# Patient Record
Sex: MALE | Race: BLACK OR AFRICAN AMERICAN | NOT HISPANIC OR LATINO | Employment: FULL TIME | URBAN - METROPOLITAN AREA
[De-identification: names, ages, dates, MRNs, and addresses within clinical notes are randomized per-mention and may not be internally consistent; named-entity substitution may affect disease eponyms.]

---

## 2017-05-03 VITALS
HEART RATE: 105 BPM | BODY MASS INDEX: 31.67 KG/M2 | WEIGHT: 209 LBS | DIASTOLIC BLOOD PRESSURE: 70 MMHG | SYSTOLIC BLOOD PRESSURE: 120 MMHG | HEIGHT: 68 IN

## 2017-05-03 RX ORDER — METFORMIN HYDROCHLORIDE 500 MG/1
500 TABLET ORAL 2 TIMES DAILY WITH MEALS
COMMUNITY

## 2017-05-03 RX ORDER — POTASSIUM CHLORIDE 1.5 G/1.58G
20 POWDER, FOR SOLUTION ORAL DAILY
COMMUNITY

## 2017-05-03 RX ORDER — FUROSEMIDE 40 MG/1
40 TABLET ORAL DAILY
COMMUNITY

## 2017-05-03 RX ORDER — ATORVASTATIN CALCIUM 40 MG/1
40 TABLET, FILM COATED ORAL DAILY
COMMUNITY

## 2017-05-03 RX ORDER — BUSPIRONE HYDROCHLORIDE 15 MG/1
15 TABLET ORAL NIGHTLY
COMMUNITY

## 2017-05-03 RX ORDER — PANTOPRAZOLE SODIUM 40 MG/1
40 TABLET, DELAYED RELEASE ORAL DAILY
COMMUNITY

## 2017-05-03 RX ORDER — AMLODIPINE BESYLATE 10 MG/1
10 TABLET ORAL DAILY
COMMUNITY

## 2017-05-03 RX ORDER — PREGABALIN 75 MG/1
75 CAPSULE ORAL 2 TIMES DAILY
COMMUNITY

## 2017-05-03 RX ORDER — DILTIAZEM HYDROCHLORIDE 180 MG/1
180 CAPSULE, COATED, EXTENDED RELEASE ORAL DAILY
COMMUNITY

## 2017-05-19 PROBLEM — I10 ESSENTIAL (PRIMARY) HYPERTENSION: Status: ACTIVE | Noted: 2017-05-19

## 2017-05-19 PROBLEM — E66.9 ADIPOSITY: Status: ACTIVE | Noted: 2017-05-19

## 2017-05-19 PROBLEM — B96.89 ACUTE BACTERIAL BRONCHITIS: Status: ACTIVE | Noted: 2017-05-19

## 2017-05-19 PROBLEM — R09.02 HYPOXIA: Status: ACTIVE | Noted: 2017-05-19

## 2017-05-19 PROBLEM — J84.10 FIBROSIS OF LUNG: Status: ACTIVE | Noted: 2017-05-19

## 2017-05-19 PROBLEM — J30.2 SEASONAL ALLERGIC RHINITIS: Status: ACTIVE | Noted: 2017-05-19

## 2017-05-19 PROBLEM — E66.9 OBESITY WITH BODY MASS INDEX 30 OR GREATER: Status: ACTIVE | Noted: 2017-05-19

## 2017-05-19 PROBLEM — E11.9 TYPE 2 DIABETES MELLITUS WITHOUT COMPLICATION: Status: ACTIVE | Noted: 2017-05-19

## 2017-05-19 PROBLEM — G47.30 SLEEP APNEA: Status: ACTIVE | Noted: 2017-05-19

## 2017-05-19 PROBLEM — Z78.9 NON-SMOKER: Status: ACTIVE | Noted: 2017-05-19

## 2017-05-19 PROBLEM — J20.8 ACUTE BACTERIAL BRONCHITIS: Status: ACTIVE | Noted: 2017-05-19

## 2017-05-30 ENCOUNTER — OFFICE VISIT (OUTPATIENT)
Dept: PULMONOLOGY | Facility: CLINIC | Age: 62
End: 2017-05-30
Payer: COMMERCIAL

## 2017-05-30 VITALS
BODY MASS INDEX: 29.4 KG/M2 | DIASTOLIC BLOOD PRESSURE: 70 MMHG | HEART RATE: 74 BPM | OXYGEN SATURATION: 94 % | SYSTOLIC BLOOD PRESSURE: 114 MMHG | HEIGHT: 68 IN | WEIGHT: 194 LBS

## 2017-05-30 DIAGNOSIS — R09.02 HYPOXIA: ICD-10-CM

## 2017-05-30 DIAGNOSIS — Z78.9 NON-SMOKER: ICD-10-CM

## 2017-05-30 DIAGNOSIS — G47.30 SLEEP APNEA, UNSPECIFIED TYPE: ICD-10-CM

## 2017-05-30 DIAGNOSIS — J84.10 FIBROSIS OF LUNG: Primary | ICD-10-CM

## 2017-05-30 DIAGNOSIS — I10 ESSENTIAL (PRIMARY) HYPERTENSION: ICD-10-CM

## 2017-05-30 DIAGNOSIS — E66.9 ADIPOSITY: ICD-10-CM

## 2017-05-30 DIAGNOSIS — E11.9 TYPE 2 DIABETES MELLITUS WITHOUT COMPLICATION, WITHOUT LONG-TERM CURRENT USE OF INSULIN: ICD-10-CM

## 2017-05-30 PROCEDURE — 99203 OFFICE O/P NEW LOW 30 MIN: CPT | Mod: ,,, | Performed by: INTERNAL MEDICINE

## 2017-05-30 NOTE — PROGRESS NOTES
Subjective:       Patient ID: Tino Zamorano is a 61 y.o. male.    Chief Complaint: Pulmonary Fibrosis (pulmonary fibrosis follow up, 3 month)    Here for follow up.  Doing well with OFEV, actually feels better - less SOB, SEE, less need for O2, less cough.  Has minimal diarrhea.  No new problems.  Reviewed [facility] pt about the medications and goals of therapy.  In March he was in hospital with atrial fibrillation (he is paroxysmal) - saw Dr Jacques at [facility].  No other new issues reported.    Home Oxygen    Face to face visit with pt regarding their home oxygen.  We have discussed the need for oxygen including continuous use, prn use or night time use (as appropriate for the pt).  We discussed the need for compliance with the therapy. We will do recertifications as necessary.     SLEEP STUDY ORDER    Pt has evidence for sleep apnea including snoring, observed apneas, poor quality sleep, excessive somnolence ([place] Sleepiness Score - 13).  Medically she needs a sleep study for evaluation and diagnosis.  This is a face to face visit regarding the need for a sleep study and possible further evaluation and/or treatment.  Pt will consider doing the test and let me know.    Review of Systems   Constitutional: Negative for activity change, chills, diaphoresis, fatigue and fever.   HENT: Negative for congestion, postnasal drip, rhinorrhea, sinus pressure, sneezing and sore throat.    Eyes: Negative for visual disturbance.   Respiratory: Negative for apnea, cough, choking, chest tightness, shortness of breath, wheezing and stridor.    Cardiovascular: Negative for chest pain, palpitations and leg swelling.   Gastrointestinal: Negative for abdominal pain, constipation, diarrhea and nausea.   Genitourinary: Negative for dysuria, frequency and urgency.   Musculoskeletal: Negative for arthralgias.   Skin: Negative for rash.   Neurological: Negative for dizziness, syncope and weakness.   Psychiatric/Behavioral: Negative for behavioral  problems and sleep disturbance. The patient is not nervous/anxious.        Objective:      Physical Exam   Constitutional: He is oriented to person, place, and time. He appears well-developed and well-nourished. No distress.   HENT:   Head: Normocephalic and atraumatic.   Right Ear: External ear normal.   Left Ear: External ear normal.   Nose: Nose normal.   Mouth/Throat: Oropharynx is clear and moist.   Eyes: EOM are normal. Pupils are equal, round, and reactive to light.   Neck: Normal range of motion. Neck supple. No JVD present. No tracheal deviation present. No thyromegaly present.   Cardiovascular: Normal rate, regular rhythm, normal heart sounds and intact distal pulses.  Exam reveals no gallop and no friction rub.    No murmur heard.  Pulmonary/Chest: Effort normal. No stridor. No respiratory distress. He has no wheezes. He has rales. He exhibits no tenderness.   Abdominal: Soft. Bowel sounds are normal. He exhibits no distension.   Musculoskeletal: Normal range of motion. He exhibits no edema or tenderness.   Missing right arm   Lymphadenopathy:     He has no cervical adenopathy.   Neurological: He is alert and oriented to person, place, and time. He has normal reflexes. No cranial nerve deficit.   Skin: He is not diaphoretic.   Psychiatric: He has a normal mood and affect. His behavior is normal.   Nursing note and vitals reviewed.      Assessment:       1. Fibrosis of lung    2. Hypoxia    3. Sleep apnea, unspecified type    4. Essential (primary) hypertension    5. Type 2 diabetes mellitus without complication, without long-term current use of insulin    6. Adiposity    7. Non-smoker        Plan:       Problem List Items Addressed This Visit        Neuro    Sleep apnea  - aware, has still not decided if he wishes further workup  - told to call if he wishes to pursue this.       Pulmonary    Fibrosis of lung - Primary  - consistent with UIP  - on OFEV  - recheck labs  - RTC 3 months    Hypoxia  -  continues with O2 as needed during the day and qhs       Cardiac    Essential (primary) hypertension  - adequate control       Endocrine    Type 2 diabetes mellitus without complication  - aware  - reports good control on metformin       Fluids/Electrolytes/Nutrition/GI    Adiposity  - d/w pt on need to exercise and work on weight loss       Other    Non-smoker      Other Visit Diagnoses    None.

## 2017-06-14 ENCOUNTER — TELEPHONE (OUTPATIENT)
Dept: PULMONOLOGY | Facility: CLINIC | Age: 62
End: 2017-06-14

## 2017-08-28 ENCOUNTER — TELEPHONE (OUTPATIENT)
Dept: PULMONOLOGY | Facility: CLINIC | Age: 62
End: 2017-08-28

## 2017-08-30 ENCOUNTER — OFFICE VISIT (OUTPATIENT)
Dept: PULMONOLOGY | Facility: CLINIC | Age: 62
End: 2017-08-30
Payer: COMMERCIAL

## 2017-08-30 VITALS
OXYGEN SATURATION: 97 % | HEART RATE: 115 BPM | WEIGHT: 190 LBS | BODY MASS INDEX: 28.79 KG/M2 | DIASTOLIC BLOOD PRESSURE: 78 MMHG | HEIGHT: 68 IN | SYSTOLIC BLOOD PRESSURE: 120 MMHG

## 2017-08-30 DIAGNOSIS — R09.02 HYPOXIA: ICD-10-CM

## 2017-08-30 DIAGNOSIS — Z78.9 NON-SMOKER: ICD-10-CM

## 2017-08-30 DIAGNOSIS — J32.9 SINUSITIS, UNSPECIFIED CHRONICITY, UNSPECIFIED LOCATION: ICD-10-CM

## 2017-08-30 DIAGNOSIS — G47.30 SLEEP APNEA, UNSPECIFIED TYPE: ICD-10-CM

## 2017-08-30 DIAGNOSIS — J84.10 FIBROSIS OF LUNG: Primary | ICD-10-CM

## 2017-08-30 PROCEDURE — 3078F DIAST BP <80 MM HG: CPT | Mod: ,,, | Performed by: INTERNAL MEDICINE

## 2017-08-30 PROCEDURE — 3008F BODY MASS INDEX DOCD: CPT | Mod: ,,, | Performed by: INTERNAL MEDICINE

## 2017-08-30 PROCEDURE — 99214 OFFICE O/P EST MOD 30 MIN: CPT | Mod: ,,, | Performed by: INTERNAL MEDICINE

## 2017-08-30 PROCEDURE — 3074F SYST BP LT 130 MM HG: CPT | Mod: ,,, | Performed by: INTERNAL MEDICINE

## 2017-08-30 RX ORDER — AZITHROMYCIN 250 MG/1
TABLET, FILM COATED ORAL
Qty: 6 TABLET | Refills: 0 | Status: SHIPPED | OUTPATIENT
Start: 2017-08-30 | End: 2017-10-25 | Stop reason: SDUPTHER

## 2017-08-30 NOTE — PROGRESS NOTES
Subjective:       Patient ID: Tino Zamorano is a 61 y.o. male.    Chief Complaint: Pulmonary Fibrosis (3 month follow up) and Sleep Apnea    Here for follow up.  Doing well with OFEV, actually feels better - less SOB, SEE, less need for O2, less cough.  Has minimal diarrhea.  Some loss of appetite.  Has developed a sinusitis (hios wife is sick also).  No other new issues.  Had CMP at Dr Javed's office which was ok.   Has flipped into atrial fibrillation and Dr Javed is addressing and may do cardioversion.      Pulmonary Fibrosis   Pertinent negatives include no abdominal pain, arthralgias, chest pain, chills, congestion, coughing, diaphoresis, fatigue, fever, nausea, rash, sore throat or weakness.     Home Oxygen    Face to face visit with pt regarding their home oxygen.  We have discussed the need for oxygen including continuous use, prn use or night time use (as appropriate for the pt).  We discussed the need for compliance with the therapy. We will do recertifications as necessary.     SLEEP STUDY ORDER    Pt has evidence for sleep apnea including snoring, observed apneas, poor quality sleep, excessive somnolence ([place] Sleepiness Score - 13).  Medically she needs a sleep study for evaluation and diagnosis.  This is a face to face visit regarding the need for a sleep study and possible further evaluation and/or treatment.  Pt will consider doing the test and let me know.    Review of Systems   Constitutional: Negative for activity change, chills, diaphoresis, fatigue and fever.   HENT: Negative for congestion, postnasal drip, rhinorrhea, sinus pressure, sneezing and sore throat.    Eyes: Negative for visual disturbance.   Respiratory: Negative for apnea, cough, choking, chest tightness, shortness of breath, wheezing and stridor.    Cardiovascular: Negative for chest pain, palpitations and leg swelling.   Gastrointestinal: Negative for abdominal pain, constipation, diarrhea and nausea.   Genitourinary: Negative  for dysuria, frequency and urgency.   Musculoskeletal: Negative for arthralgias.   Skin: Negative for rash.   Neurological: Negative for dizziness, syncope and weakness.   Psychiatric/Behavioral: Negative for behavioral problems and sleep disturbance. The patient is not nervous/anxious.        Objective:      Physical Exam   Constitutional: He is oriented to person, place, and time. He appears well-developed and well-nourished. No distress.   HENT:   Head: Normocephalic and atraumatic.   Right Ear: External ear normal.   Left Ear: External ear normal.   Nose: Nose normal.   Mouth/Throat: Oropharynx is clear and moist.   Eyes: EOM are normal. Pupils are equal, round, and reactive to light.   Neck: Normal range of motion. Neck supple. No JVD present. No tracheal deviation present. No thyromegaly present.   Cardiovascular: Normal rate, regular rhythm, normal heart sounds and intact distal pulses.  Exam reveals no gallop and no friction rub.    No murmur heard.  Pulmonary/Chest: Effort normal. No stridor. No respiratory distress. He has no wheezes. He has rales. He exhibits no tenderness.   Abdominal: Soft. Bowel sounds are normal. He exhibits no distension.   Musculoskeletal: Normal range of motion. He exhibits no edema or tenderness.   Missing right arm   Lymphadenopathy:     He has no cervical adenopathy.   Neurological: He is alert and oriented to person, place, and time. He has normal reflexes. No cranial nerve deficit.   Skin: He is not diaphoretic.   Psychiatric: He has a normal mood and affect. His behavior is normal.   Nursing note and vitals reviewed.      Assessment:       No diagnosis found.    Plan:       Problem List Items Addressed This Visit        Neuro    Sleep apnea  - aware, has still not decided if he wishes further workup  - told to call if he wishes to pursue this.       Pulmonary    Fibrosis of lung - Primary  - consistent with UIP  - on OFEV  - labs ok  - RTC 3 months    Hypoxia  - continues  with O2 as needed during the day and qhs       Cardiac    Essential (primary) hypertension  - adequate control       Endocrine    Type 2 diabetes mellitus without complication  - aware  - reports good control on metformin       Fluids/Electrolytes/Nutrition/GI    Adiposity  - d/w pt on need to exercise and work on weight loss       Other    Non-smoker      Other Visit Diagnoses    None.

## 2017-10-24 ENCOUNTER — TELEPHONE (OUTPATIENT)
Dept: PULMONOLOGY | Facility: CLINIC | Age: 62
End: 2017-10-24

## 2017-10-24 DIAGNOSIS — J32.9 SINUSITIS, UNSPECIFIED CHRONICITY, UNSPECIFIED LOCATION: ICD-10-CM

## 2017-10-25 RX ORDER — AZITHROMYCIN 250 MG/1
TABLET, FILM COATED ORAL
Qty: 6 TABLET | Refills: 0 | Status: SHIPPED | OUTPATIENT
Start: 2017-10-25 | End: 2018-01-10 | Stop reason: SDUPTHER

## 2017-10-25 RX ORDER — PREDNISONE 10 MG/1
TABLET ORAL
Qty: 18 TABLET | Refills: 0 | Status: SHIPPED | OUTPATIENT
Start: 2017-10-25 | End: 2018-01-10 | Stop reason: SDUPTHER

## 2017-11-29 ENCOUNTER — OFFICE VISIT (OUTPATIENT)
Dept: PULMONOLOGY | Facility: CLINIC | Age: 62
End: 2017-11-29
Payer: COMMERCIAL

## 2017-11-29 VITALS
BODY MASS INDEX: 29.7 KG/M2 | SYSTOLIC BLOOD PRESSURE: 120 MMHG | DIASTOLIC BLOOD PRESSURE: 84 MMHG | HEIGHT: 68 IN | HEART RATE: 91 BPM | WEIGHT: 196 LBS | OXYGEN SATURATION: 95 %

## 2017-11-29 DIAGNOSIS — R09.02 HYPOXIA: ICD-10-CM

## 2017-11-29 DIAGNOSIS — J84.10 FIBROSIS OF LUNG: Primary | ICD-10-CM

## 2017-11-29 PROCEDURE — 99214 OFFICE O/P EST MOD 30 MIN: CPT | Mod: ,,, | Performed by: INTERNAL MEDICINE

## 2017-11-29 NOTE — PROGRESS NOTES
Subjective:       Patient ID: Tino Zamorano is a 62 y.o. male.    Chief Complaint: Pulmonary Fibrosis (follow up 3 month)    8/30/2017 - Here for follow up.  Doing well with OFEV, actually feels better - less SOB, SEE, less need for O2, less cough.  Has minimal diarrhea.  Some loss of appetite.  Has developed a sinusitis (hios wife is sick also).  No other new issues.  Had CMP at Dr Liu's office which was ok.   Has flipped into atrial fibrillation and Dr Liu is addressing and may do cardioversion.    11/29/2017 - Here for follow.  Still taking OFEV.  Has good days and bad days.  Appetite is better.    No trouble with diarrhea at this time.  Did not get atrial fibrillation converted but seems to be in NSR by exam today.  No new issues reported.      Pulmonary Fibrosis   Pertinent negatives include no abdominal pain, arthralgias, chest pain, chills, congestion, coughing, diaphoresis, fatigue, fever, nausea, rash, sore throat or weakness.     Home Oxygen    Face to face visit with pt regarding their home oxygen.  We have discussed the need for oxygen including continuous use, prn use or night time use (as appropriate for the pt).  We discussed the need for compliance with the therapy. We will do recertifications as necessary.     SLEEP STUDY ORDER    Pt has evidence for sleep apnea including snoring, observed apneas, poor quality sleep, excessive somnolence ([place] Sleepiness Score - 13).  Medically he needs a sleep study for evaluation and diagnosis.  This is a face to face visit regarding the need for a sleep study and possible further evaluation and/or treatment.  Pt will consider doing the test and let me know.    Review of Systems   Constitutional: Negative for activity change, chills, diaphoresis, fatigue and fever.   HENT: Negative for congestion, postnasal drip, rhinorrhea, sinus pressure, sneezing and sore throat.    Eyes: Negative for visual disturbance.   Respiratory: Negative for apnea, cough, choking,  chest tightness, shortness of breath, wheezing and stridor.    Cardiovascular: Negative for chest pain, palpitations and leg swelling.   Gastrointestinal: Negative for abdominal pain, constipation, diarrhea and nausea.   Genitourinary: Negative for dysuria, frequency and urgency.   Musculoskeletal: Negative for arthralgias.   Skin: Negative for rash.   Neurological: Negative for dizziness, syncope and weakness.   Psychiatric/Behavioral: Negative for behavioral problems and sleep disturbance. The patient is not nervous/anxious.        Objective:      Physical Exam   Constitutional: He is oriented to person, place, and time. He appears well-developed and well-nourished. No distress.   HENT:   Head: Normocephalic and atraumatic.   Right Ear: External ear normal.   Left Ear: External ear normal.   Nose: Nose normal.   Mouth/Throat: Oropharynx is clear and moist.   Eyes: EOM are normal. Pupils are equal, round, and reactive to light.   Neck: Normal range of motion. Neck supple. No JVD present. No tracheal deviation present. No thyromegaly present.   Cardiovascular: Normal rate, regular rhythm, normal heart sounds and intact distal pulses.  Exam reveals no gallop and no friction rub.    No murmur heard.  Pulmonary/Chest: Effort normal. No stridor. No respiratory distress. He has no wheezes. He has rales. He exhibits no tenderness.   Abdominal: Soft. Bowel sounds are normal. He exhibits no distension.   Musculoskeletal: Normal range of motion. He exhibits no edema or tenderness.   Missing right arm   Lymphadenopathy:     He has no cervical adenopathy.   Neurological: He is alert and oriented to person, place, and time. He has normal reflexes. No cranial nerve deficit.   Skin: He is not diaphoretic.   Psychiatric: He has a normal mood and affect. His behavior is normal.   Nursing note and vitals reviewed.      Assessment:       No diagnosis found.    Plan:       Problem List Items Addressed This Visit        Neuro    Sleep  apnea  - aware, has still not decided if he wishes further workup  - told to call if he wishes to pursue this.       Pulmonary    Fibrosis of lung - Primary  - consistent with UIP  - on OFEV  - labs ok  - RTC 3 months will repeat labs after that visit    Hypoxia  - continues with O2 as needed during the day and qhs       Cardiac    Essential (primary) hypertension  - adequate control       Endocrine    Type 2 diabetes mellitus without complication  - aware  - reports good control on metformin       Fluids/Electrolytes/Nutrition/GI    Adiposity  - d/w pt on need to exercise and work on weight loss       Other    Non-smoker      Other Visit Diagnoses    None.

## 2018-01-10 ENCOUNTER — TELEPHONE (OUTPATIENT)
Dept: PULMONOLOGY | Facility: CLINIC | Age: 63
End: 2018-01-10

## 2018-01-10 DIAGNOSIS — J32.9 SINUSITIS, UNSPECIFIED CHRONICITY, UNSPECIFIED LOCATION: ICD-10-CM

## 2018-01-10 RX ORDER — AZITHROMYCIN 250 MG/1
TABLET, FILM COATED ORAL
Qty: 6 TABLET | Refills: 0 | Status: SHIPPED | OUTPATIENT
Start: 2018-01-10 | End: 2018-02-28 | Stop reason: SDUPTHER

## 2018-01-10 RX ORDER — PREDNISONE 10 MG/1
TABLET ORAL
Qty: 18 TABLET | Refills: 0 | Status: SHIPPED | OUTPATIENT
Start: 2018-01-10 | End: 2018-02-28 | Stop reason: SDUPTHER

## 2018-01-10 NOTE — TELEPHONE ENCOUNTER
Antibiotic and steroid needed, starting to have chest congestion and productive cough. Wont be able to come in, having to take care of , she fell and has fractured her leg x3, in wheelchair x 6 months

## 2018-02-28 ENCOUNTER — OFFICE VISIT (OUTPATIENT)
Dept: PULMONOLOGY | Facility: CLINIC | Age: 63
End: 2018-02-28
Payer: COMMERCIAL

## 2018-02-28 VITALS
HEART RATE: 84 BPM | OXYGEN SATURATION: 97 % | BODY MASS INDEX: 29.7 KG/M2 | SYSTOLIC BLOOD PRESSURE: 120 MMHG | DIASTOLIC BLOOD PRESSURE: 74 MMHG | WEIGHT: 196 LBS | HEIGHT: 68 IN

## 2018-02-28 DIAGNOSIS — J32.9 SINUSITIS, UNSPECIFIED CHRONICITY, UNSPECIFIED LOCATION: ICD-10-CM

## 2018-02-28 DIAGNOSIS — R09.02 HYPOXIA: ICD-10-CM

## 2018-02-28 DIAGNOSIS — G47.33 OBSTRUCTIVE SLEEP APNEA SYNDROME: ICD-10-CM

## 2018-02-28 DIAGNOSIS — J84.10 FIBROSIS OF LUNG: Primary | ICD-10-CM

## 2018-02-28 PROCEDURE — 3008F BODY MASS INDEX DOCD: CPT | Mod: ,,, | Performed by: INTERNAL MEDICINE

## 2018-02-28 PROCEDURE — 99214 OFFICE O/P EST MOD 30 MIN: CPT | Mod: ,,, | Performed by: INTERNAL MEDICINE

## 2018-02-28 RX ORDER — AZITHROMYCIN 250 MG/1
TABLET, FILM COATED ORAL
Qty: 6 TABLET | Refills: 0 | Status: SHIPPED | OUTPATIENT
Start: 2018-02-28 | End: 2018-04-10 | Stop reason: SDUPTHER

## 2018-02-28 RX ORDER — PREDNISONE 10 MG/1
TABLET ORAL
Qty: 18 TABLET | Refills: 0 | Status: SHIPPED | OUTPATIENT
Start: 2018-02-28 | End: 2018-06-01 | Stop reason: SDUPTHER

## 2018-02-28 NOTE — PROGRESS NOTES
Subjective:       Patient ID: Tino Zamorano is a 62 y.o. male.    Chief Complaint: Pulmonary Fibrosis (3 month fibrosis follow up)    8/30/2017 - Here for follow up.  Doing well with OFEV, actually feels better - less SOB, SEE, less need for O2, less cough.  Has minimal diarrhea.  Some loss of appetite.  Has developed a sinusitis (hios wife is sick also).  No other new issues.  Had CMP at Dr Liu's office which was ok.   Has flipped into atrial fibrillation and Dr Liu is addressing and may do cardioversion.    11/29/2017 - Here for follow.  Still taking OFEV.  Has good days and bad days.  Appetite is better.    No trouble with diarrhea at this time.  Did not get atrial fibrillation converted but seems to be in NSR by exam today.  No new issues reported.    2/28/2018 - Here for follow up reports that about 3 weeks ago had some dark stools.  He held his Eliquis and OFEV for a few days and the problem resolved.  Stools have normalized.  Otherwise doing OK.  I have give him the names of several GI MD in the [place] area to contact about a appointment.  He does have a little cough and congestion.      Pulmonary Fibrosis   Pertinent negatives include no abdominal pain, arthralgias, chest pain, chills, congestion, coughing, diaphoresis, fatigue, fever, nausea, rash, sore throat or weakness.     Home Oxygen    Face to face visit with pt regarding their home oxygen.  We have discussed the need for oxygen including continuous use, prn use or night time use (as appropriate for the pt).  We discussed the need for compliance with the therapy. We will do recertifications as necessary.     SLEEP STUDY ORDER    Pt has evidence for sleep apnea including snoring, observed apneas, poor quality sleep, excessive somnolence ([place] Sleepiness Score - 13).  Medically he needs a sleep study for evaluation and diagnosis.  This is a face to face visit regarding the need for a sleep study and possible further evaluation and/or treatment.   Pt will consider doing the test and let me know.    Review of Systems   Constitutional: Negative for activity change, chills, diaphoresis, fatigue and fever.   HENT: Negative for congestion, postnasal drip, rhinorrhea, sinus pressure, sneezing and sore throat.    Eyes: Negative for visual disturbance.   Respiratory: Negative for apnea, cough, choking, chest tightness, shortness of breath, wheezing and stridor.    Cardiovascular: Negative for chest pain, palpitations and leg swelling.   Gastrointestinal: Negative for abdominal pain, constipation, diarrhea and nausea.   Genitourinary: Negative for dysuria, frequency and urgency.   Musculoskeletal: Negative for arthralgias.   Skin: Negative for rash.   Neurological: Negative for dizziness, syncope and weakness.   Psychiatric/Behavioral: Negative for behavioral problems and sleep disturbance. The patient is not nervous/anxious.        Objective:      Physical Exam   Constitutional: He is oriented to person, place, and time. He appears well-developed and well-nourished. No distress.   HENT:   Head: Normocephalic and atraumatic.   Right Ear: External ear normal.   Left Ear: External ear normal.   Nose: Nose normal.   Mouth/Throat: Oropharynx is clear and moist.   Eyes: EOM are normal. Pupils are equal, round, and reactive to light.   Neck: Normal range of motion. Neck supple. No JVD present. No tracheal deviation present. No thyromegaly present.   Cardiovascular: Normal rate, regular rhythm, normal heart sounds and intact distal pulses.  Exam reveals no gallop and no friction rub.    No murmur heard.  Pulmonary/Chest: Effort normal. No stridor. No respiratory distress. He has no wheezes. He has rales. He exhibits no tenderness.   Rales about 1/4 way up posterior   Abdominal: Soft. Bowel sounds are normal. He exhibits no distension.   Musculoskeletal: Normal range of motion. He exhibits no edema or tenderness.   Missing right arm   Lymphadenopathy:     He has no cervical  adenopathy.   Neurological: He is alert and oriented to person, place, and time. He has normal reflexes. No cranial nerve deficit.   Skin: He is not diaphoretic.   Psychiatric: He has a normal mood and affect. His behavior is normal.   Nursing note and vitals reviewed.      Assessment:       No diagnosis found.    Plan:       Problem List Items Addressed This Visit        Neuro    Sleep apnea  - aware, has still not decided if he wishes further workup  - told to call if he wishes to pursue this.       Pulmonary    Fibrosis of lung - Primary  - consistent with UIP  - on OFEV  - labs o- to be rechecked through Dr Liu's office  - RTC 3 months    Hypoxia  - continues with O2 as needed during the day and qhs       Cardiac    Essential (primary) hypertension  - adequate control       Endocrine    Type 2 diabetes mellitus without complication  - aware  - reports good control on metformin       Fluids/Electrolytes/Nutrition/GI    Adiposity  - d/w pt on need to exercise and work on weight loss       Other    Non-smoker      Other Visit Diagnoses    None.

## 2018-04-09 ENCOUNTER — TELEPHONE (OUTPATIENT)
Dept: PULMONOLOGY | Facility: CLINIC | Age: 63
End: 2018-04-09

## 2018-04-09 DIAGNOSIS — J32.9 SINUSITIS, UNSPECIFIED CHRONICITY, UNSPECIFIED LOCATION: ICD-10-CM

## 2018-04-09 NOTE — TELEPHONE ENCOUNTER
Had bloodwork recently that hes faxing over bc LFT's elevated. I will forward when they come, told patient depending on how high we may have him just repeat in a week. Asked if any recent illnesses, he did report having minor cold symptoms recently.    Called back Tuesday asking if we can go ahead and send a rx for all the chest congestion he is having

## 2018-04-10 ENCOUNTER — TELEPHONE (OUTPATIENT)
Dept: PULMONOLOGY | Facility: CLINIC | Age: 63
End: 2018-04-10

## 2018-04-10 RX ORDER — AZITHROMYCIN 250 MG/1
TABLET, FILM COATED ORAL
Qty: 6 TABLET | Refills: 0 | Status: SHIPPED | OUTPATIENT
Start: 2018-04-10 | End: 2018-06-01 | Stop reason: SDUPTHER

## 2018-06-01 ENCOUNTER — TELEPHONE (OUTPATIENT)
Dept: PULMONOLOGY | Facility: CLINIC | Age: 63
End: 2018-06-01

## 2018-06-01 DIAGNOSIS — J32.9 SINUSITIS, UNSPECIFIED CHRONICITY, UNSPECIFIED LOCATION: ICD-10-CM

## 2018-06-01 RX ORDER — AZITHROMYCIN 250 MG/1
TABLET, FILM COATED ORAL
Qty: 6 TABLET | Refills: 0 | Status: SHIPPED | OUTPATIENT
Start: 2018-06-01 | End: 2018-08-21 | Stop reason: SDUPTHER

## 2018-06-01 RX ORDER — PREDNISONE 10 MG/1
TABLET ORAL
Qty: 18 TABLET | Refills: 0 | Status: SHIPPED | OUTPATIENT
Start: 2018-06-01 | End: 2018-08-21 | Stop reason: SDUPTHER

## 2018-06-01 NOTE — TELEPHONE ENCOUNTER
Ms. Ventura got admitted to [facility] for pneumonia following her recent fall and anle fx., now  c/o chest congestion and productive cough, asking for abx & steroid so he doesn't end up as bad as her.

## 2018-07-23 ENCOUNTER — OFFICE VISIT (OUTPATIENT)
Dept: PULMONOLOGY | Facility: CLINIC | Age: 63
End: 2018-07-23
Payer: COMMERCIAL

## 2018-07-23 VITALS
OXYGEN SATURATION: 95 % | SYSTOLIC BLOOD PRESSURE: 110 MMHG | BODY MASS INDEX: 30.31 KG/M2 | HEIGHT: 68 IN | DIASTOLIC BLOOD PRESSURE: 78 MMHG | WEIGHT: 200 LBS | HEART RATE: 94 BPM

## 2018-07-23 DIAGNOSIS — J84.10 FIBROSIS OF LUNG: Primary | ICD-10-CM

## 2018-07-23 DIAGNOSIS — R09.02 HYPOXIA: ICD-10-CM

## 2018-07-23 DIAGNOSIS — G47.33 OBSTRUCTIVE SLEEP APNEA SYNDROME: ICD-10-CM

## 2018-07-23 PROCEDURE — 99214 OFFICE O/P EST MOD 30 MIN: CPT | Mod: ,,, | Performed by: INTERNAL MEDICINE

## 2018-07-23 NOTE — PROGRESS NOTES
Subjective:       Patient ID: Tino Zamorano is a 62 y.o. male.    Chief Complaint: Pulmonary Fibrosis (Ofev side effects, nausea, vomiting, diarrhea)    8/30/2017 - Here for follow up.  Doing well with OFEV, actually feels better - less SOB, SEE, less need for O2, less cough.  Has minimal diarrhea.  Some loss of appetite.  Has developed a sinusitis (hios wife is sick also).  No other new issues.  Had CMP at Dr Liu's office which was ok.   Has flipped into atrial fibrillation and Dr Liu is addressing and may do cardioversion.    11/29/2017 - Here for follow.  Still taking OFEV.  Has good days and bad days.  Appetite is better.    No trouble with diarrhea at this time.  Did not get atrial fibrillation converted but seems to be in NSR by exam today.  No new issues reported.    2/28/2018 - Here for follow up reports that about 3 weeks ago had some dark stools.  He held his Eliquis and OFEV for a few days and the problem resolved.  Stools have normalized.  Otherwise doing OK.  I have give him the names of several GI MD in the [place] area to contact about a appointment.  He does have a little cough and congestion.    7/23/2018 - Here for follow up, has noted some trouble with nausea, vomiting and diarrhea over the last week.. Has started to hold his OFEV.  NO fever, chills, sweats, only crampy abdominal pain.  Breathing has been ok.  Labs in 3/2018 were ok except for elevated T Bili and alkaline phosphatase.  No other new symptoms.       Pulmonary Fibrosis   Pertinent negatives include no abdominal pain, arthralgias, chest pain, chills, congestion, coughing, diaphoresis, fatigue, fever, nausea, rash, sore throat or weakness.     Home Oxygen    Face to face visit with pt regarding their home oxygen.  We have discussed the need for oxygen including continuous use, prn use or night time use (as appropriate for the pt).  We discussed the need for compliance with the therapy. We will do recertifications as necessary.  "    SLEEP STUDY ORDER    Pt has evidence for sleep apnea including snoring, observed apneas, poor quality sleep, excessive somnolence ([place] Sleepiness Score - 13).  Medically he needs a sleep study for evaluation and diagnosis.  This is a face to face visit regarding the need for a sleep study and possible further evaluation and/or treatment.  Pt will consider doing the test and let me know.    Review of Systems   Constitutional: Negative for activity change, chills, diaphoresis, fatigue and fever.   HENT: Negative for congestion, postnasal drip, rhinorrhea, sinus pressure, sneezing and sore throat.    Eyes: Negative for visual disturbance.   Respiratory: Negative for apnea, cough, choking, chest tightness, shortness of breath, wheezing and stridor.    Cardiovascular: Negative for chest pain, palpitations and leg swelling.   Gastrointestinal: Negative for abdominal pain, constipation, diarrhea and nausea.   Genitourinary: Negative for dysuria, frequency and urgency.   Musculoskeletal: Negative for arthralgias.   Skin: Negative for rash.   Neurological: Negative for dizziness, syncope and weakness.   Psychiatric/Behavioral: Negative for behavioral problems and sleep disturbance. The patient is not nervous/anxious.        Objective:       Vitals:    07/23/18 0935   BP: 110/78   Pulse: 94   SpO2: 95%   Weight: 90.7 kg (200 lb)   Height: 5' 8" (1.727 m)       Physical Exam   Constitutional: He is oriented to person, place, and time. He appears well-developed and well-nourished. No distress.   HENT:   Head: Normocephalic and atraumatic.   Right Ear: External ear normal.   Left Ear: External ear normal.   Nose: Nose normal.   Mouth/Throat: Oropharynx is clear and moist.   Eyes: EOM are normal. Pupils are equal, round, and reactive to light.   Neck: Normal range of motion. Neck supple. No JVD present. No tracheal deviation present. No thyromegaly present.   Cardiovascular: Normal rate, regular rhythm and intact distal " pulses.  Exam reveals no gallop and no friction rub.    Murmur heard.  Pulmonary/Chest: Effort normal. No stridor. No respiratory distress. He has no wheezes. He has rales. He exhibits no tenderness.   Rales about 1/4 way up posterior   Abdominal: Soft. Bowel sounds are normal. He exhibits no distension.   Musculoskeletal: Normal range of motion. He exhibits no edema or tenderness.   Missing right arm   Lymphadenopathy:     He has no cervical adenopathy.   Neurological: He is alert and oriented to person, place, and time. He has normal reflexes. No cranial nerve deficit.   Skin: He is not diaphoretic.   Psychiatric: He has a normal mood and affect. His behavior is normal.   Nursing note and vitals reviewed.      Assessment:       No diagnosis found.    Plan:       Problem List Items Addressed This Visit        Neuro    Sleep apnea  - aware, has still not decided if he wishes further workup  - told to call if he wishes to pursue this.       Pulmonary    Fibrosis of lung - Primary  - consistent with UIP  - on OFEV - currently being held  - labs - CMP, CBC  - RTC 3 months      Hypoxia  - continues with O2 as needed during the day and qhs       Cardiac    Essential (primary) hypertension  - adequate control       Endocrine    Type 2 diabetes mellitus without complication  - aware  - reports good control on metformin       Fluids/Electrolytes/Nutrition/GI    Adiposity  - d/w pt on need to exercise and work on weight loss       Other    Non-smoker      Other Visit Diagnoses    None.

## 2018-07-25 ENCOUNTER — TELEPHONE (OUTPATIENT)
Dept: PULMONOLOGY | Facility: CLINIC | Age: 63
End: 2018-07-25

## 2018-08-21 ENCOUNTER — TELEPHONE (OUTPATIENT)
Dept: PULMONOLOGY | Facility: CLINIC | Age: 63
End: 2018-08-21

## 2018-08-21 DIAGNOSIS — J32.9 SINUSITIS, UNSPECIFIED CHRONICITY, UNSPECIFIED LOCATION: ICD-10-CM

## 2018-08-21 RX ORDER — AZITHROMYCIN 250 MG/1
TABLET, FILM COATED ORAL
Qty: 6 TABLET | Refills: 0 | Status: SHIPPED | OUTPATIENT
Start: 2018-08-21 | End: 2019-08-29 | Stop reason: SDUPTHER

## 2018-08-21 RX ORDER — PREDNISONE 10 MG/1
TABLET ORAL
Qty: 18 TABLET | Refills: 0 | Status: SHIPPED | OUTPATIENT
Start: 2018-08-21 | End: 2019-05-21 | Stop reason: SDUPTHER

## 2018-09-26 ENCOUNTER — TELEPHONE (OUTPATIENT)
Dept: PULMONOLOGY | Facility: CLINIC | Age: 63
End: 2018-09-26

## 2018-09-26 DIAGNOSIS — K92.1 BLOOD IN STOOL: Primary | ICD-10-CM

## 2018-09-26 DIAGNOSIS — J84.10 PULMONARY FIBROSIS: ICD-10-CM

## 2018-09-26 DIAGNOSIS — J84.10 PULMONARY FIBROSIS: Primary | ICD-10-CM

## 2018-09-26 DIAGNOSIS — K92.1 MELENA: ICD-10-CM

## 2018-09-26 NOTE — TELEPHONE ENCOUNTER
Decreased Ofev to just 1/day, been off Eliquis but having nose bleeds, dark stools and bloating. I asked about when he can see his PCP, states has appt Tuesday next week

## 2018-10-31 ENCOUNTER — OFFICE VISIT (OUTPATIENT)
Dept: PULMONOLOGY | Facility: CLINIC | Age: 63
End: 2018-10-31
Payer: COMMERCIAL

## 2018-10-31 VITALS
HEIGHT: 68 IN | WEIGHT: 200 LBS | OXYGEN SATURATION: 88 % | BODY MASS INDEX: 30.31 KG/M2 | DIASTOLIC BLOOD PRESSURE: 70 MMHG | HEART RATE: 84 BPM | SYSTOLIC BLOOD PRESSURE: 120 MMHG

## 2018-10-31 DIAGNOSIS — G47.33 OBSTRUCTIVE SLEEP APNEA SYNDROME: ICD-10-CM

## 2018-10-31 DIAGNOSIS — R09.02 HYPOXIA: ICD-10-CM

## 2018-10-31 DIAGNOSIS — J84.10 FIBROSIS OF LUNG: Primary | ICD-10-CM

## 2018-10-31 PROCEDURE — 99214 OFFICE O/P EST MOD 30 MIN: CPT | Mod: ,,, | Performed by: INTERNAL MEDICINE

## 2018-10-31 NOTE — PROGRESS NOTES
"Subjective:       Patient ID: Tino Zamorano is a 63 y.o. male.    Chief Complaint: Pulmonary Fibrosis (fibrosis follow up from July)    8/30/2017 - Here for follow up.  Doing well with OFEV, actually feels better - less SOB, SEE, less need for O2, less cough.  Has minimal diarrhea.  Some loss of appetite.  Has developed a sinusitis (hios wife is sick also).  No other new issues.  Had CMP at Dr Liu's office which was ok.   Has flipped into atrial fibrillation and Dr Liu is addressing and may do cardioversion.    11/29/2017 - Here for follow.  Still taking OFEV.  Has good days and bad days.  Appetite is better.    No trouble with diarrhea at this time.  Did not get atrial fibrillation converted but seems to be in NSR by exam today.  No new issues reported.    2/28/2018 - Here for follow up reports that about 3 weeks ago had some dark stools.  He held his Eliquis and OFEV for a few days and the problem resolved.  Stools have normalized.  Otherwise doing OK.  I have give him the names of several GI MD in the [place] area to contact about a appointment.  He does have a little cough and congestion.    7/23/2018 - Here for follow up, has noted some trouble with nausea, vomiting and diarrhea over the last week.. Has started to hold his OFEV.  NO fever, chills, sweats, only crampy abdominal pain.  Breathing has been ok.  Labs in 3/2018 were ok except for elevated T Bili and alkaline phosphatase.  No other new symptoms.     10/31/2018 - Here for follow up, breathing has been OK, had to stop OFEV (about 1 mopnth ago) developed constipation (unusual) and some "dark stools" (better now but to see GI - Dr So).  Has noted some incarese in nonproductive cough since stopping OFEV.  He will call after seeing Dr So and will consider restarting OFEV.  Had stress test with Dr Liu it was ok.      Pulmonary Fibrosis   Pertinent negatives include no abdominal pain, arthralgias, chest pain, chills, congestion, coughing, " "diaphoresis, fatigue, fever, nausea, rash, sore throat or weakness.     Home Oxygen    Face to face visit with pt regarding their home oxygen.  We have discussed the need for oxygen including continuous use, prn use or night time use (as appropriate for the pt).  We discussed the need for compliance with the therapy. We will do recertifications as necessary.     SLEEP STUDY ORDER    Pt has evidence for sleep apnea including snoring, observed apneas, poor quality sleep, excessive somnolence ([place] Sleepiness Score - 13).  Medically he needs a sleep study for evaluation and diagnosis.  This is a face to face visit regarding the need for a sleep study and possible further evaluation and/or treatment.  Pt will consider doing the test and let me know.    Review of Systems   Constitutional: Negative for activity change, chills, diaphoresis, fatigue and fever.   HENT: Negative for congestion, postnasal drip, rhinorrhea, sinus pressure, sneezing and sore throat.    Eyes: Negative for visual disturbance.   Respiratory: Negative for apnea, cough, choking, chest tightness, shortness of breath, wheezing and stridor.    Cardiovascular: Negative for chest pain, palpitations and leg swelling.   Gastrointestinal: Negative for abdominal pain, constipation, diarrhea and nausea.   Genitourinary: Negative for dysuria, frequency and urgency.   Musculoskeletal: Negative for arthralgias.   Skin: Negative for rash.   Neurological: Negative for dizziness, syncope and weakness.   Psychiatric/Behavioral: Negative for behavioral problems and sleep disturbance. The patient is not nervous/anxious.        Objective:       Vitals:    10/31/18 1116   BP: 120/70   Pulse: 84   SpO2: (!) 88%   Weight: 90.7 kg (200 lb)   Height: 5' 8" (1.727 m)       Physical Exam   Constitutional: He is oriented to person, place, and time. He appears well-developed and well-nourished. No distress.   HENT:   Head: Normocephalic and atraumatic.   Right Ear: External " ear normal.   Left Ear: External ear normal.   Nose: Nose normal.   Mouth/Throat: Oropharynx is clear and moist.   Eyes: EOM are normal. Pupils are equal, round, and reactive to light.   Neck: Normal range of motion. Neck supple. No JVD present. No tracheal deviation present. No thyromegaly present.   Cardiovascular: Normal rate, regular rhythm and intact distal pulses. Exam reveals no gallop and no friction rub.   Murmur heard.  Pulmonary/Chest: Effort normal. No stridor. No respiratory distress. He has no wheezes. He has rales. He exhibits no tenderness.   Rales about 1/4 way up posterior   Abdominal: Soft. Bowel sounds are normal. He exhibits no distension.   Musculoskeletal: Normal range of motion. He exhibits edema. He exhibits no tenderness.   Missing right arm   Lymphadenopathy:     He has no cervical adenopathy.   Neurological: He is alert and oriented to person, place, and time. He has normal reflexes. No cranial nerve deficit.   Skin: He is not diaphoretic.   Psychiatric: He has a normal mood and affect. His behavior is normal.   Nursing note and vitals reviewed.      Assessment:       No diagnosis found.    Plan:       Problem List Items Addressed This Visit        Neuro    Sleep apnea  - aware, has still not decided if he wishes further workup  - told to call if he wishes to pursue this.       Pulmonary    Fibrosis of lung - Primary  - consistent with UIP  - on OFEV - currently being held  - RTC 3 months      Hypoxia  - continues with O2 as needed during the day and qhs       Cardiac    Essential (primary) hypertension  - adequate control       Endocrine    Type 2 diabetes mellitus without complication  - reports good control       Fluids/Electrolytes/Nutrition/GI    Adiposity  - d/w pt on need to exercise and work on weight loss       Other    Non-smoker      Other Visit Diagnoses    None.

## 2018-11-19 ENCOUNTER — TELEPHONE (OUTPATIENT)
Dept: PULMONOLOGY | Facility: CLINIC | Age: 63
End: 2018-11-19

## 2018-11-19 RX ORDER — LEVOFLOXACIN 500 MG/1
500 TABLET, FILM COATED ORAL DAILY
Qty: 7 TABLET | Refills: 0 | Status: SHIPPED | OUTPATIENT
Start: 2018-11-19 | End: 2019-03-19 | Stop reason: SDUPTHER

## 2018-11-29 ENCOUNTER — TELEPHONE (OUTPATIENT)
Dept: PULMONOLOGY | Facility: CLINIC | Age: 63
End: 2018-11-29

## 2018-11-29 DIAGNOSIS — J84.10 PULMONARY FIBROSIS: Primary | ICD-10-CM

## 2018-11-29 NOTE — TELEPHONE ENCOUNTER
Would like [email or web address] portable concentrator, asking if we can order one, his current DME does not carry it, only the heavy portable tanks.   Also wants you to know he started back on Ofev

## 2019-03-19 ENCOUNTER — TELEPHONE (OUTPATIENT)
Dept: PULMONOLOGY | Facility: CLINIC | Age: 64
End: 2019-03-19

## 2019-03-19 RX ORDER — LEVOFLOXACIN 500 MG/1
500 TABLET, FILM COATED ORAL DAILY
Qty: 7 TABLET | Refills: 0 | Status: SHIPPED | OUTPATIENT
Start: 2019-03-19 | End: 2019-05-21 | Stop reason: SDUPTHER

## 2019-05-06 ENCOUNTER — OFFICE VISIT (OUTPATIENT)
Dept: PULMONOLOGY | Facility: CLINIC | Age: 64
End: 2019-05-06
Payer: COMMERCIAL

## 2019-05-06 VITALS
OXYGEN SATURATION: 95 % | SYSTOLIC BLOOD PRESSURE: 130 MMHG | WEIGHT: 191 LBS | DIASTOLIC BLOOD PRESSURE: 74 MMHG | BODY MASS INDEX: 28.95 KG/M2 | HEART RATE: 91 BPM | HEIGHT: 68 IN

## 2019-05-06 DIAGNOSIS — R09.02 HYPOXIA: ICD-10-CM

## 2019-05-06 DIAGNOSIS — J84.10 FIBROSIS OF LUNG: ICD-10-CM

## 2019-05-06 DIAGNOSIS — G47.33 OBSTRUCTIVE SLEEP APNEA SYNDROME: ICD-10-CM

## 2019-05-06 DIAGNOSIS — J84.10 PULMONARY FIBROSIS: Primary | ICD-10-CM

## 2019-05-06 PROCEDURE — 3008F PR BODY MASS INDEX (BMI) DOCUMENTED: ICD-10-PCS | Mod: ,,, | Performed by: INTERNAL MEDICINE

## 2019-05-06 PROCEDURE — 3008F BODY MASS INDEX DOCD: CPT | Mod: ,,, | Performed by: INTERNAL MEDICINE

## 2019-05-06 PROCEDURE — 3078F DIAST BP <80 MM HG: CPT | Mod: ,,, | Performed by: INTERNAL MEDICINE

## 2019-05-06 PROCEDURE — 3078F PR MOST RECENT DIASTOLIC BLOOD PRESSURE < 80 MM HG: ICD-10-PCS | Mod: ,,, | Performed by: INTERNAL MEDICINE

## 2019-05-06 PROCEDURE — 99214 PR OFFICE/OUTPT VISIT, EST, LEVL IV, 30-39 MIN: ICD-10-PCS | Mod: ,,, | Performed by: INTERNAL MEDICINE

## 2019-05-06 PROCEDURE — 3075F PR MOST RECENT SYSTOLIC BLOOD PRESS GE 130-139MM HG: ICD-10-PCS | Mod: ,,, | Performed by: INTERNAL MEDICINE

## 2019-05-06 PROCEDURE — 99214 OFFICE O/P EST MOD 30 MIN: CPT | Mod: ,,, | Performed by: INTERNAL MEDICINE

## 2019-05-06 PROCEDURE — 3075F SYST BP GE 130 - 139MM HG: CPT | Mod: ,,, | Performed by: INTERNAL MEDICINE

## 2019-05-06 NOTE — PROGRESS NOTES
"Subjective:       Patient ID: Tino Zamorano is a 63 y.o. male.    Chief Complaint: Pulmonary Fibrosis (6 month follow up)    8/30/2017 - Here for follow up.  Doing well with OFEV, actually feels better - less SOB, SEE, less need for O2, less cough.  Has minimal diarrhea.  Some loss of appetite.  Has developed a sinusitis (hios wife is sick also).  No other new issues.  Had CMP at Dr Liu's office which was ok.   Has flipped into atrial fibrillation and Dr Liu is addressing and may do cardioversion.    11/29/2017 - Here for follow.  Still taking OFEV.  Has good days and bad days.  Appetite is better.    No trouble with diarrhea at this time.  Did not get atrial fibrillation converted but seems to be in NSR by exam today.  No new issues reported.    2/28/2018 - Here for follow up reports that about 3 weeks ago had some dark stools.  He held his Eliquis and OFEV for a few days and the problem resolved.  Stools have normalized.  Otherwise doing OK.  I have give him the names of several GI MD in the [place] area to contact about a appointment.  He does have a little cough and congestion.    7/23/2018 - Here for follow up, has noted some trouble with nausea, vomiting and diarrhea over the last week.. Has started to hold his OFEV.  NO fever, chills, sweats, only crampy abdominal pain.  Breathing has been ok.  Labs in 3/2018 were ok except for elevated T Bili and alkaline phosphatase.  No other new symptoms.     10/31/2018 - Here for follow up, breathing has been OK, had to stop OFEV (about 1 mopnth ago) developed constipation (unusual) and some "dark stools" (better now but to see GI - Dr So).  Has noted some incarese in nonproductive cough since stopping OFEV.  He will call after seeing Dr So and will consider restarting OFEV.  Had stress test with Dr Liu it was ok.    5/5/2019 - Here for follow up, has only been able to tolerate 1 OFEV daily (due to GI symptoms).  Saw Dr So and that was OK.  No new " "symptoms.    Pulmonary Fibrosis   Pertinent negatives include no abdominal pain, arthralgias, chest pain, chills, congestion, coughing, diaphoresis, fatigue, fever, nausea, rash, sore throat or weakness.     Home Oxygen    Face to face visit with pt regarding their home oxygen.  We have discussed the need for oxygen including continuous use, prn use or night time use (as appropriate for the pt).  We discussed the need for compliance with the therapy. We will do recertifications as necessary.     SLEEP STUDY ORDER    Pt has evidence for sleep apnea including snoring, observed apneas, poor quality sleep, excessive somnolence ([place] Sleepiness Score - 13).  Medically he needs a sleep study for evaluation and diagnosis.  This is a face to face visit regarding the need for a sleep study and possible further evaluation and/or treatment.  Pt will consider doing the test and let me know.    Review of Systems   Constitutional: Negative for activity change, chills, diaphoresis, fatigue and fever.   HENT: Negative for congestion, postnasal drip, rhinorrhea, sinus pressure, sneezing and sore throat.    Eyes: Negative for visual disturbance.   Respiratory: Negative for apnea, cough, choking, chest tightness, shortness of breath, wheezing and stridor.    Cardiovascular: Negative for chest pain, palpitations and leg swelling.   Gastrointestinal: Negative for abdominal pain, constipation, diarrhea and nausea.   Genitourinary: Negative for dysuria, frequency and urgency.   Musculoskeletal: Negative for arthralgias.   Skin: Negative for rash.   Neurological: Negative for dizziness, syncope and weakness.   Psychiatric/Behavioral: Negative for behavioral problems and sleep disturbance. The patient is not nervous/anxious.        Objective:       Vitals:    05/06/19 0930   BP: 130/74   Pulse: 91   SpO2: 95%   Weight: 86.6 kg (191 lb)   Height: 5' 8" (1.727 m)       Physical Exam   Constitutional: He is oriented to person, place, and " time. He appears well-developed and well-nourished. No distress.   HENT:   Head: Normocephalic.   Right Ear: External ear normal.   Left Ear: External ear normal.   Nose: Nose normal.   Mouth/Throat: Oropharynx is clear and moist.   Healed laceration to right forehead   Eyes: Pupils are equal, round, and reactive to light. EOM are normal.   Neck: Normal range of motion. Neck supple. No JVD present. No tracheal deviation present. No thyromegaly present.   Cardiovascular: Normal rate, regular rhythm and intact distal pulses. Exam reveals no gallop and no friction rub.   Murmur heard.  Pulmonary/Chest: Effort normal. No stridor. No respiratory distress. He has no wheezes. He has rales. He exhibits no tenderness.   Rales about 1/4 way up posterior   Abdominal: Soft. Bowel sounds are normal. He exhibits no distension.   Musculoskeletal: Normal range of motion. He exhibits edema. He exhibits no tenderness.   Missing right arm   Lymphadenopathy:     He has no cervical adenopathy.   Neurological: He is alert and oriented to person, place, and time. He has normal reflexes. No cranial nerve deficit.   Skin: He is not diaphoretic.   Psychiatric: He has a normal mood and affect. His behavior is normal.   Nursing note and vitals reviewed.      Assessment:       No diagnosis found.    Plan:       Problem List Items Addressed This Visit        Neuro    Sleep apnea  - aware, has still not decided if he wishes further workup  - told to call if he wishes to pursue this.       Pulmonary    Fibrosis of lung - Primary  - consistent with UIP  - on OFEV - tolerating  - RTC 3 months    Pulmonary Hypertension  - group 3      Hypoxia  - continues with O2 as needed during the day and qhs       Cardiac    Essential (primary) hypertension  - adequate control       Endocrine    Type 2 diabetes mellitus without complication  - reports good control       Fluids/Electrolytes/Nutrition/GI    Adiposity  - d/w pt on need to exercise and work on weight  loss       Other    Non-smoker      Other Visit Diagnoses    None.         All Chang MD

## 2019-05-13 ENCOUNTER — TELEPHONE (OUTPATIENT)
Dept: PULMONOLOGY | Facility: CLINIC | Age: 64
End: 2019-05-13

## 2019-05-21 ENCOUNTER — TELEPHONE (OUTPATIENT)
Dept: PULMONOLOGY | Facility: CLINIC | Age: 64
End: 2019-05-21

## 2019-05-21 RX ORDER — LEVOFLOXACIN 500 MG/1
500 TABLET, FILM COATED ORAL DAILY
Qty: 7 TABLET | Refills: 0 | Status: SHIPPED | OUTPATIENT
Start: 2019-05-21

## 2019-05-21 RX ORDER — PREDNISONE 10 MG/1
TABLET ORAL
Qty: 18 TABLET | Refills: 0 | Status: SHIPPED | OUTPATIENT
Start: 2019-05-21 | End: 2019-09-04 | Stop reason: SDUPTHER

## 2019-08-29 ENCOUNTER — TELEPHONE (OUTPATIENT)
Dept: PULMONOLOGY | Facility: CLINIC | Age: 64
End: 2019-08-29

## 2019-08-29 DIAGNOSIS — J32.9 SINUSITIS, UNSPECIFIED CHRONICITY, UNSPECIFIED LOCATION: ICD-10-CM

## 2019-08-29 RX ORDER — AZITHROMYCIN 250 MG/1
TABLET, FILM COATED ORAL
Qty: 6 TABLET | Refills: 0 | Status: SHIPPED | OUTPATIENT
Start: 2019-08-29

## 2019-09-04 ENCOUNTER — TELEPHONE (OUTPATIENT)
Dept: PULMONOLOGY | Facility: CLINIC | Age: 64
End: 2019-09-04

## 2019-09-04 RX ORDER — PREDNISONE 10 MG/1
TABLET ORAL
Qty: 18 TABLET | Refills: 0 | Status: SHIPPED | OUTPATIENT
Start: 2019-09-04

## 2019-09-04 NOTE — TELEPHONE ENCOUNTER
Finished the zpack still having difficult time laying down bc so congested, asking of he needs a steroid too?

## 2019-10-16 ENCOUNTER — TELEPHONE (OUTPATIENT)
Dept: PULMONOLOGY | Facility: CLINIC | Age: 64
End: 2019-10-16

## 2019-10-16 NOTE — TELEPHONE ENCOUNTER
----- Message from Thu Peters sent at 10/16/2019  8:48 AM CDT -----  Contact: Self, [phone]  The patient called stating he wanted to alert the office he is admitted at [facility].  He is requesting that either Dr. Chang or his nurse give him a call on his cell phone.  Thanks in advance.     Talked with pt by phone admitted with fluid overload.  Told him to call us after DC.

## 2019-10-17 DIAGNOSIS — J84.10 FIBROSIS OF LUNG: Primary | ICD-10-CM

## 2019-10-17 RX ORDER — NINTEDANIB 150 MG/1
150 CAPSULE ORAL EVERY 12 HOURS
Qty: 180 CAPSULE | Refills: 6 | Status: SHIPPED | OUTPATIENT
Start: 2019-10-17

## 2019-10-17 NOTE — TELEPHONE ENCOUNTER
Called back from the hospital asking me to refill his ofev. Last shipment I see was from [facility] in July, asked if hes gone that long without, he said no because he had a large stockpile from the time he took a break, so I called [facility]-they said insurance is invalid and tried to call pt 4 x's with no contact. Looks like we need to start from scratch-send rx to [facility] with new insurance, phone # and I'll most likely have to re-start the PA process

## 2019-10-17 NOTE — TELEPHONE ENCOUNTER
Pressure in lungs high, and one side of his heart has failed, sending pulmonologist and nephrologist today